# Patient Record
Sex: FEMALE
[De-identification: names, ages, dates, MRNs, and addresses within clinical notes are randomized per-mention and may not be internally consistent; named-entity substitution may affect disease eponyms.]

---

## 2022-05-25 ENCOUNTER — APPOINTMENT (OUTPATIENT)
Dept: ORTHOPEDIC SURGERY | Facility: CLINIC | Age: 52
End: 2022-05-25
Payer: COMMERCIAL

## 2022-05-25 VITALS
SYSTOLIC BLOOD PRESSURE: 152 MMHG | HEIGHT: 62 IN | BODY MASS INDEX: 28.71 KG/M2 | HEART RATE: 86 BPM | DIASTOLIC BLOOD PRESSURE: 91 MMHG | WEIGHT: 156 LBS

## 2022-05-25 DIAGNOSIS — Z78.9 OTHER SPECIFIED HEALTH STATUS: ICD-10-CM

## 2022-05-25 PROCEDURE — 20550 NJX 1 TENDON SHEATH/LIGAMENT: CPT | Mod: F5

## 2022-05-25 PROCEDURE — 99203 OFFICE O/P NEW LOW 30 MIN: CPT | Mod: 25

## 2022-05-28 PROBLEM — Z78.9 CURRENT NON-SMOKER: Status: ACTIVE | Noted: 2022-05-28

## 2022-05-28 NOTE — DISCUSSION/SUMMARY
[FreeTextEntry1] : Patient has history and physical findings consistent with trigger finger right thumb.  Patient complains of considerable thumb pain with radiation but it is most likely due to the A1 pulley tenderness and tendinosis of the FPL tendon.  I have reviewed treatment options risks and complications with patient.  After full discussion patient requested and was treated with 2 phase cortisone injection without complication.\par The statistical chances of resolution versus recurrence, and the statistics regarding the possibility of additional injections  were discussed with the patient.\par The following post-injection instructions were given to the patient: The patient should be cautious in activities for two weeks and then increase to full activities.  Thereafter, the patient should return if symptoms continue, or if they lalo and recur subsequently. If symptoms do not recur, the patient need not return and can be seen on an as needed basis. The patient has expressed understanding and acceptance of analysis, treatment, and recommendations.  All questions answered.

## 2022-05-28 NOTE — HISTORY OF PRESENT ILLNESS
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

## 2022-05-28 NOTE — PHYSICAL EXAM
[de-identified] : Right hand\par Thumb A1 pulley markedly tender.\par Too painful to fully flex.\par There is no other A1 pulley tenderness or triggering in any other finger, right hand.\par No pertinent CMC, MP, PIP, or DIP joint contributory finding.\par \par Left hand\par No A1 pulley tenderness and no triggering in any finger.\par No pertinent CMC, MP, PIP, or DIP joint contributory finding.\par \par Neurologic: Median, ulnar, and radial motor and sensory are intact. \par Skin: No cyanosis, clubbing, or rashes.\par Vascular: Radial pulses intact.\par Lymphatic: No streaking or epitrochlear adenopathy.\par The patient is awake, alert, and oriented. Affect appropriate. Cooperative.

## 2022-09-12 ENCOUNTER — APPOINTMENT (OUTPATIENT)
Dept: ORTHOPEDIC SURGERY | Facility: CLINIC | Age: 52
End: 2022-09-12

## 2022-09-12 VITALS
HEART RATE: 90 BPM | SYSTOLIC BLOOD PRESSURE: 143 MMHG | WEIGHT: 157 LBS | BODY MASS INDEX: 28.89 KG/M2 | DIASTOLIC BLOOD PRESSURE: 98 MMHG | HEIGHT: 62 IN

## 2022-09-12 DIAGNOSIS — M79.644 PAIN IN RIGHT FINGER(S): ICD-10-CM

## 2022-09-12 DIAGNOSIS — M65.311 TRIGGER THUMB, RIGHT THUMB: ICD-10-CM

## 2022-09-12 PROCEDURE — 99214 OFFICE O/P EST MOD 30 MIN: CPT | Mod: 25

## 2022-09-12 PROCEDURE — 20550 NJX 1 TENDON SHEATH/LIGAMENT: CPT | Mod: F5

## 2022-09-12 NOTE — HISTORY OF PRESENT ILLNESS
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

## 2022-09-12 NOTE — PHYSICAL EXAM
[de-identified] : Right hand\par Thumb A1 pulley markedly tender.\par Too painful to fully flex.\par (Following local anesthetic patient was able to flex the thumb and there was active triggering demonstrated.)\par There is no other A1 pulley tenderness or triggering in any other finger, right hand.\par No pertinent CMC, MP, PIP, or DIP joint contributory finding.\par \par Left hand\par No A1 pulley tenderness and no triggering in any finger.\par No pertinent CMC, MP, PIP, or DIP joint contributory finding.\par \par Neurologic: Median, ulnar, and radial motor and sensory are intact. \par Skin: No cyanosis, clubbing, or rashes.\par Vascular: Radial pulses intact.\par Lymphatic: No streaking or epitrochlear adenopathy.\par The patient is awake, alert, and oriented. Affect appropriate. Cooperative.

## 2022-09-12 NOTE — DISCUSSION/SUMMARY
[FreeTextEntry1] : Patient has recurrence of trigger finger right thumb.  Patient was treated in May and had relief of triggering until early August.  The thumb is now triggering is quite painful.  Because recurrence is only 2 to 2-1/2 months after cortisone injection the statistical chance for resolution with today's cortisone injection is probably no better than 50-50% and I have explained this to the patient.  Nonetheless the patient would prefer cortisone injection to surgery.  Consequently patient is treated with a second cortisone injection for trigger finger right thumb.  The thumb pain appears to be related to the triggering.\par I have explained to patient that if triggering recurs it will be appropriate to discuss surgical treatment.\par Prognosis limited.\par Patient has no other notable hand complaint that requires intervention.\par The following post-injection instructions were given to the patient: The patient should be cautious in activities for two weeks and then increase to full activities.  Thereafter, the patient should return if symptoms continue, or if they lalo and recur subsequently. If symptoms do not recur, the patient need not return and can be seen on an as needed basis. The patient has expressed understanding and acceptance of analysis, treatment, and recommendations.  All questions answered.